# Patient Record
Sex: FEMALE | Race: WHITE | Employment: FULL TIME | URBAN - METROPOLITAN AREA
[De-identification: names, ages, dates, MRNs, and addresses within clinical notes are randomized per-mention and may not be internally consistent; named-entity substitution may affect disease eponyms.]

---

## 2017-02-18 ENCOUNTER — APPOINTMENT (OUTPATIENT)
Dept: GENERAL RADIOLOGY | Facility: HOSPITAL | Age: 42
End: 2017-02-18
Attending: EMERGENCY MEDICINE
Payer: COMMERCIAL

## 2017-02-18 ENCOUNTER — HOSPITAL ENCOUNTER (EMERGENCY)
Facility: HOSPITAL | Age: 42
Discharge: HOME | End: 2017-02-18
Attending: EMERGENCY MEDICINE
Payer: COMMERCIAL

## 2017-02-18 VITALS
HEART RATE: 78 BPM | TEMPERATURE: 99 F | DIASTOLIC BLOOD PRESSURE: 85 MMHG | OXYGEN SATURATION: 98 % | BODY MASS INDEX: 22 KG/M2 | SYSTOLIC BLOOD PRESSURE: 123 MMHG | WEIGHT: 120 LBS | RESPIRATION RATE: 16 BRPM

## 2017-02-18 DIAGNOSIS — S39.012A BACK STRAIN, INITIAL ENCOUNTER: ICD-10-CM

## 2017-02-18 DIAGNOSIS — S16.1XXA CERVICAL STRAIN, INITIAL ENCOUNTER: Primary | ICD-10-CM

## 2017-02-18 PROCEDURE — 72072 X-RAY EXAM THORAC SPINE 3VWS: CPT

## 2017-02-18 PROCEDURE — 72050 X-RAY EXAM NECK SPINE 4/5VWS: CPT

## 2017-02-18 PROCEDURE — 99284 EMERGENCY DEPT VISIT MOD MDM: CPT

## 2017-02-18 RX ORDER — IBUPROFEN 600 MG/1
600 TABLET ORAL ONCE
Status: COMPLETED | OUTPATIENT
Start: 2017-02-18 | End: 2017-02-18
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
## 2017-02-26 ENCOUNTER — HOSPITAL ENCOUNTER (EMERGENCY)
Facility: HOSPITAL | Age: 42
Discharge: HOME | End: 2017-02-26
Attending: EMERGENCY MEDICINE
Payer: COMMERCIAL

## 2017-02-26 VITALS
HEIGHT: 62 IN | DIASTOLIC BLOOD PRESSURE: 74 MMHG | OXYGEN SATURATION: 100 % | RESPIRATION RATE: 16 BRPM | BODY MASS INDEX: 22.08 KG/M2 | SYSTOLIC BLOOD PRESSURE: 124 MMHG | TEMPERATURE: 99 F | WEIGHT: 120 LBS | HEART RATE: 100 BPM

## 2017-02-26 DIAGNOSIS — R20.2 PARESTHESIAS: Primary | ICD-10-CM

## 2017-02-26 PROCEDURE — 99283 EMERGENCY DEPT VISIT LOW MDM: CPT

## 2017-02-26 RX ORDER — IBUPROFEN 600 MG/1
600 TABLET ORAL EVERY 8 HOURS PRN
Qty: 20 TABLET | Refills: 0 | Status: SHIPPED | OUTPATIENT
Start: 2017-02-26

## 2017-02-26 RX ORDER — CYCLOBENZAPRINE HCL 10 MG
10 TABLET ORAL 3 TIMES DAILY PRN
Qty: 21 TABLET | Refills: 0 | Status: SHIPPED | OUTPATIENT
Start: 2017-02-26 | End: 2017-03-05
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
## 2017-03-01 ENCOUNTER — OFFICE VISIT (OUTPATIENT)
Dept: NEUROLOGY | Facility: CLINIC | Age: 42
End: 2017-03-01

## 2017-03-01 VITALS
RESPIRATION RATE: 16 BRPM | SYSTOLIC BLOOD PRESSURE: 104 MMHG | HEART RATE: 84 BPM | DIASTOLIC BLOOD PRESSURE: 76 MMHG | BODY MASS INDEX: 22 KG/M2 | WEIGHT: 123 LBS

## 2017-03-01 DIAGNOSIS — R20.9 DISTURBANCE OF SKIN SENSATION: Primary | ICD-10-CM

## 2017-03-01 PROBLEM — R20.0 BILATERAL HAND NUMBNESS: Status: ACTIVE | Noted: 2017-03-01

## 2017-03-01 PROCEDURE — 99205 OFFICE O/P NEW HI 60 MIN: CPT | Performed by: OTHER

## 2017-03-01 NOTE — PROGRESS NOTES
Neurology H&P    Jeison Clock Patient Status:  No patient class for patient encounter    10/1/1975 MRN [id number]   Location [address] [place] [address], [address] [place] [address] Drive, [address] [facility] Attending No att. providers found   [facility] Day #  PCP Karley Philip MD     [facility] Problem List:  Patient Active Problem List:     Sinus tachycardia      PMHx:  No past medical history on file.     PSHx:      Past Surgical History             SocHx:    Smoking Status: Never Smoker                      Alcohol Use: Yes tremors; normal MEGHANN    REFLEXES: 2+ at biceps, 2+ triceps, 2+ at patella, 2+ at the ankles. Toes downgoing    GAIT: normal stance, normal toe gait and heel gait, tandem well.     Romberg's: negative  Labs:  nO RECENT LABS       Imaging:   C Spine 2/2017

## 2017-03-01 NOTE — PATIENT INSTRUCTIONS
Refill policies:    • Allow 2 business days for refills; controlled substances may take longer.   • Contact your pharmacy at least 5 days prior to running out of medication and have them send an electronic request or submit request through the “request re your physician has recommended that you have a procedure or additional testing performed. [facility]) will contact your insurance carrier to obtain pre-certification or prior authorization.     Unfortunately, KEVEN has seen an increas
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
## 2017-04-06 ENCOUNTER — OFFICE VISIT (OUTPATIENT)
Dept: NEUROLOGY | Facility: CLINIC | Age: 42
End: 2017-04-06

## 2017-04-06 DIAGNOSIS — R20.9 DISTURBANCE OF SKIN SENSATION: Primary | ICD-10-CM

## 2017-04-06 PROCEDURE — 95886 MUSC TEST DONE W/N TEST COMP: CPT | Performed by: OTHER

## 2017-04-06 PROCEDURE — 95909 NRV CNDJ TST 5-6 STUDIES: CPT | Performed by: OTHER

## 2017-04-06 NOTE — PROCEDURES
[facility]  [address] [facility]  Phone- [phone]  Nerve Conduction & Electromyography Report            Patient: Tashi Mukherjee Age: 37 Years 6 Months  Patient ID: [id number] Referring MPERLA: Kely King Spontaneous MUAP Recruitment    IA Fib PSW Fasc H.F. Amp Dur. PPP Pattern   R. DELTOID N None None None None N N N N   R. TRICEPS N None None None None N N N N   R. BICEPS N None None None None N N N N   R.  FLEX CARPI RAD N None None None None N N N N   R

## 2017-04-06 NOTE — PATIENT INSTRUCTIONS
Refill policies:    • Allow 2 business days for refills; controlled substances may take longer.   • Contact your pharmacy at least 5 days prior to running out of medication and have them send an electronic request or submit request through the “request re insurance carrier to obtain pre-certification or prior authorization. Unfortunately, KEVEN has seen an increase in denial of payment even though the procedure/test has been pre-certified.   You are strongly encouraged to contact your insurance carrier to v

## 2017-04-24 ENCOUNTER — OFFICE VISIT (OUTPATIENT)
Dept: NEUROLOGY | Facility: CLINIC | Age: 42
End: 2017-04-24

## 2017-04-24 VITALS
DIASTOLIC BLOOD PRESSURE: 62 MMHG | WEIGHT: 124 LBS | BODY MASS INDEX: 23 KG/M2 | SYSTOLIC BLOOD PRESSURE: 104 MMHG | RESPIRATION RATE: 18 BRPM | HEART RATE: 76 BPM

## 2017-04-24 DIAGNOSIS — M54.2 NECK PAIN: ICD-10-CM

## 2017-04-24 DIAGNOSIS — R20.9 DISTURBANCE OF SKIN SENSATION: Primary | ICD-10-CM

## 2017-04-24 PROCEDURE — 99213 OFFICE O/P EST LOW 20 MIN: CPT | Performed by: OTHER

## 2017-04-24 NOTE — PROGRESS NOTES
Neurology H&P    Blanca  Patient Status:  No patient class for patient encounter    10/1/1975 MRN [id number]   Location [address] [place] [address], [address] [place] [address] Drive, [address] [facility] Attending No att. providers found   [facility] Day #  PCP MD Bel Patricia reproduce this pain with head movements especially to the L. She denies any new weakness. Mostly just pain like \"something is pulling\". She has not gone to PT. She denies nay slurred speech or LUE symptoms. She denies nay falls or gait imbalance.  She den cyanosis      Neurologic:   MENTAL STATUS: alert, ox3, normal attention, language and fund of knowledge.       CRANIAL NERVES II to XII: PERRLA, no ptosis or diplopia, EOM intact, facial sensation intact, strong eye closure and lower facial muscles & jaw mu evaluation  - We can consider repeat EMG/NCS at a later date (3-4 months) if sympotoms persist but pt was unable to tolerate this test the last time and is hesitant to repeat it.      Veatrice Sessions, DO  Neurology

## 2017-06-12 ENCOUNTER — TELEPHONE (OUTPATIENT)
Dept: SURGERY | Facility: CLINIC | Age: 42
End: 2017-06-12

## 2017-06-12 NOTE — TELEPHONE ENCOUNTER
[employer]. Spoke to [place]. Pt can have claim billed. No auth given    Called pt major medical [employer] HMO. Spoke to rep [place].  States that pt needs a referral to have services rendered at outside facility or there is no coverage if not compl

## 2017-06-21 ENCOUNTER — TELEPHONE (OUTPATIENT)
Dept: NEUROLOGY | Facility: CLINIC | Age: 42
End: 2017-06-21

## 2017-06-27 ENCOUNTER — TELEPHONE (OUTPATIENT)
Dept: NEUROLOGY | Facility: CLINIC | Age: 42
End: 2017-06-27

## 2017-07-20 NOTE — TELEPHONE ENCOUNTER
LMOM for pt to contact office to reschedule 7/24/17 appt w/ Dr. Feng Cuevas. Notified pt her appt will be cancelled.

## 2017-08-24 ENCOUNTER — TELEPHONE (OUTPATIENT)
Dept: NEUROLOGY | Facility: CLINIC | Age: 42
End: 2017-08-24

## 2017-08-24 DIAGNOSIS — R20.9 DISTURBANCE OF SKIN SENSATION: ICD-10-CM

## 2017-08-24 DIAGNOSIS — M54.2 NECK PAIN: Primary | ICD-10-CM

## 2017-09-05 ENCOUNTER — APPOINTMENT (OUTPATIENT)
Dept: PHYSICAL THERAPY | Facility: HOSPITAL | Age: 42
End: 2017-09-05
Attending: Other
Payer: COMMERCIAL

## 2017-09-07 ENCOUNTER — APPOINTMENT (OUTPATIENT)
Dept: PHYSICAL THERAPY | Facility: HOSPITAL | Age: 42
End: 2017-09-07
Attending: Other
Payer: COMMERCIAL

## 2017-09-12 ENCOUNTER — APPOINTMENT (OUTPATIENT)
Dept: PHYSICAL THERAPY | Facility: HOSPITAL | Age: 42
End: 2017-09-12
Attending: Other
Payer: COMMERCIAL

## 2017-09-12 ENCOUNTER — HOSPITAL ENCOUNTER (OUTPATIENT)
Dept: PHYSICAL THERAPY | Facility: HOSPITAL | Age: 42
Setting detail: THERAPIES SERIES
Discharge: HOME | End: 2017-09-12
Attending: Other
Payer: COMMERCIAL

## 2017-09-12 DIAGNOSIS — M54.2 NECK PAIN: ICD-10-CM

## 2017-09-12 DIAGNOSIS — R20.9 DISTURBANCE OF SKIN SENSATION: ICD-10-CM

## 2017-09-12 PROCEDURE — 97162 PT EVAL MOD COMPLEX 30 MIN: CPT

## 2017-09-12 PROCEDURE — 97140 MANUAL THERAPY 1/> REGIONS: CPT
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
## 2017-09-14 ENCOUNTER — APPOINTMENT (OUTPATIENT)
Dept: PHYSICAL THERAPY | Facility: HOSPITAL | Age: 42
End: 2017-09-14
Attending: Other
Payer: COMMERCIAL

## 2017-09-19 ENCOUNTER — APPOINTMENT (OUTPATIENT)
Dept: PHYSICAL THERAPY | Facility: HOSPITAL | Age: 42
End: 2017-09-19
Attending: Other
Payer: COMMERCIAL

## 2017-09-21 ENCOUNTER — APPOINTMENT (OUTPATIENT)
Dept: PHYSICAL THERAPY | Facility: HOSPITAL | Age: 42
End: 2017-09-21
Attending: Other
Payer: COMMERCIAL

## 2017-09-26 ENCOUNTER — APPOINTMENT (OUTPATIENT)
Dept: PHYSICAL THERAPY | Facility: HOSPITAL | Age: 42
End: 2017-09-26
Attending: Other
Payer: COMMERCIAL

## 2017-09-28 ENCOUNTER — APPOINTMENT (OUTPATIENT)
Dept: PHYSICAL THERAPY | Facility: HOSPITAL | Age: 42
End: 2017-09-28
Attending: Other
Payer: COMMERCIAL

## 2017-10-10 ENCOUNTER — HOSPITAL ENCOUNTER (OUTPATIENT)
Dept: PHYSICAL THERAPY | Facility: HOSPITAL | Age: 42
Setting detail: THERAPIES SERIES
Discharge: HOME | End: 2017-10-10
Attending: Other
Payer: COMMERCIAL

## 2017-10-10 PROCEDURE — 97110 THERAPEUTIC EXERCISES: CPT

## 2017-10-10 PROCEDURE — 97140 MANUAL THERAPY 1/> REGIONS: CPT
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
## 2017-10-17 ENCOUNTER — HOSPITAL ENCOUNTER (OUTPATIENT)
Dept: PHYSICAL THERAPY | Facility: HOSPITAL | Age: 42
Setting detail: THERAPIES SERIES
Discharge: HOME | End: 2017-10-17
Attending: Other
Payer: COMMERCIAL

## 2017-10-17 PROCEDURE — 97110 THERAPEUTIC EXERCISES: CPT

## 2017-10-17 PROCEDURE — 97140 MANUAL THERAPY 1/> REGIONS: CPT
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
## 2017-10-24 ENCOUNTER — HOSPITAL ENCOUNTER (OUTPATIENT)
Dept: PHYSICAL THERAPY | Facility: HOSPITAL | Age: 42
Setting detail: THERAPIES SERIES
Discharge: HOME | End: 2017-10-24
Attending: Other
Payer: COMMERCIAL

## 2017-10-24 PROCEDURE — 97110 THERAPEUTIC EXERCISES: CPT

## 2017-10-24 PROCEDURE — 97140 MANUAL THERAPY 1/> REGIONS: CPT
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
## 2017-11-07 ENCOUNTER — HOSPITAL ENCOUNTER (OUTPATIENT)
Dept: PHYSICAL THERAPY | Facility: HOSPITAL | Age: 42
Setting detail: THERAPIES SERIES
End: 2017-11-07
Attending: Other
Payer: COMMERCIAL

## 2017-11-07 PROCEDURE — 97110 THERAPEUTIC EXERCISES: CPT

## 2017-11-07 PROCEDURE — 97140 MANUAL THERAPY 1/> REGIONS: CPT
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
## 2017-11-14 ENCOUNTER — HOSPITAL ENCOUNTER (OUTPATIENT)
Dept: PHYSICAL THERAPY | Facility: HOSPITAL | Age: 42
Setting detail: THERAPIES SERIES
Discharge: HOME | End: 2017-11-14
Attending: Other
Payer: COMMERCIAL

## 2017-11-14 PROCEDURE — 97140 MANUAL THERAPY 1/> REGIONS: CPT

## 2017-11-14 PROCEDURE — 97110 THERAPEUTIC EXERCISES: CPT
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
## 2017-11-21 ENCOUNTER — APPOINTMENT (OUTPATIENT)
Dept: PHYSICAL THERAPY | Facility: HOSPITAL | Age: 42
End: 2017-11-21
Attending: Other
Payer: COMMERCIAL

## 2018-03-16 ENCOUNTER — TELEPHONE (OUTPATIENT)
Dept: NEUROLOGY | Facility: CLINIC | Age: 43
End: 2018-03-16
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
## 2018-03-20 ENCOUNTER — OFFICE VISIT (OUTPATIENT)
Dept: NEUROLOGY | Facility: CLINIC | Age: 43
End: 2018-03-20

## 2018-03-20 ENCOUNTER — TELEPHONE (OUTPATIENT)
Dept: NEUROLOGY | Facility: CLINIC | Age: 43
End: 2018-03-20

## 2018-03-20 ENCOUNTER — LAB ENCOUNTER (OUTPATIENT)
Dept: LAB | Age: 43
End: 2018-03-20
Attending: Other
Payer: COMMERCIAL

## 2018-03-20 VITALS
HEART RATE: 80 BPM | DIASTOLIC BLOOD PRESSURE: 80 MMHG | WEIGHT: 120 LBS | SYSTOLIC BLOOD PRESSURE: 110 MMHG | BODY MASS INDEX: 22 KG/M2

## 2018-03-20 DIAGNOSIS — R89.9 ABNORMAL LABORATORY TEST: Primary | ICD-10-CM

## 2018-03-20 DIAGNOSIS — M54.2 NECK PAIN: ICD-10-CM

## 2018-03-20 DIAGNOSIS — R20.9 DISTURBANCE OF SKIN SENSATION: Primary | ICD-10-CM

## 2018-03-20 DIAGNOSIS — R20.9 DISTURBANCE OF SKIN SENSATION: ICD-10-CM

## 2018-03-20 LAB
BUN BLD-MCNC: 15 MG/DL (ref 8–20)
CALCIUM BLD-MCNC: 9.5 MG/DL (ref 8.3–10.3)
CHLORIDE: 105 MMOL/L (ref 101–111)
CO2: 29 MMOL/L (ref 22–32)
CREAT BLD-MCNC: 0.84 MG/DL (ref 0.55–1.02)
GLUCOSE BLD-MCNC: 97 MG/DL (ref 70–99)
HAV AB SERPL IA-ACNC: 749 PG/ML (ref 193–986)
POTASSIUM SERPL-SCNC: 4.2 MMOL/L (ref 3.6–5.1)
RHEUMATOID FACT SERPL-ACNC: <10 IU/ML (ref ?–15)
SED RATE-ML: 5 MM/HR (ref 0–25)
SODIUM SERPL-SCNC: 140 MMOL/L (ref 136–144)

## 2018-03-20 PROCEDURE — 86431 RHEUMATOID FACTOR QUANT: CPT | Performed by: OTHER

## 2018-03-20 PROCEDURE — 85652 RBC SED RATE AUTOMATED: CPT | Performed by: OTHER

## 2018-03-20 PROCEDURE — 86235 NUCLEAR ANTIGEN ANTIBODY: CPT | Performed by: OTHER

## 2018-03-20 PROCEDURE — 80048 BASIC METABOLIC PNL TOTAL CA: CPT | Performed by: OTHER

## 2018-03-20 PROCEDURE — 84207 ASSAY OF VITAMIN B-6: CPT | Performed by: OTHER

## 2018-03-20 PROCEDURE — 86225 DNA ANTIBODY NATIVE: CPT | Performed by: OTHER

## 2018-03-20 PROCEDURE — 36415 COLL VENOUS BLD VENIPUNCTURE: CPT | Performed by: OTHER

## 2018-03-20 PROCEDURE — 86038 ANTINUCLEAR ANTIBODIES: CPT | Performed by: OTHER

## 2018-03-20 PROCEDURE — 99213 OFFICE O/P EST LOW 20 MIN: CPT | Performed by: OTHER

## 2018-03-20 PROCEDURE — 82607 VITAMIN B-12: CPT | Performed by: OTHER

## 2018-03-20 NOTE — PROGRESS NOTES
Neurology H&P    Sindi Arrow Patient Status:  No patient class for patient encounter    10/1/1975 MRN [id number]   Location ED [facility], [address], [address] Attending No att. providers found   Hosp Day #  PCP DO [place] her BUE. She feels that PT had helped a lot with this. She has tried to adjust her work space for better ergonomics. She states that her paresthesias are very random and that she cannot identify any exacerbating factors.  She states that she is also having dry  Extremities: No clubbing or cyanosis      Neurologic:   MENTAL STATUS: alert, ox3, normal attention, language and fund of knowledge.       CRANIAL NERVES II to XII: PERRLA, no ptosis or diplopia, EOM intact, facial sensation intact, strong eye closure C spine for evaluation of any cervical stenosis that might be contributing to her paresthesias and subjective weakness.  Etiology of her whole body symptoms is very unclear and unlikely to be neuropathy givrn acute onset and previous CT C spine without sten

## 2018-03-20 NOTE — PATIENT INSTRUCTIONS
Refill policies:    • Allow 2-3 business days for refills; controlled substances may take longer.   • Contact your pharmacy at least 5 days prior to running out of medication and have them send an electronic request or submit request through the [facility] for the entire amount billed. Precertification and Prior Authorizations  If your physician has recommended that you have a procedure or additional testing performed.   [employer]) will contact your insurance carrier to obtain pr

## 2018-03-20 NOTE — PROGRESS NOTES
Patient here to follow up for Neck pain. Completed PT and consulted with chiropractor but no relief.   States she has weird sensations- numbness and tingling in arms, hands and legs and feet

## 2018-03-20 NOTE — TELEPHONE ENCOUNTER
I called MsWilliams Clay Mansfield on the phone today regarding her blood work. No abnormalities noted to date.  She verbalized understanding and had no further questions

## 2018-03-21 LAB
ANA SCREEN: POSITIVE
CENTROMERE AUTOAB: <100 AU/ML (ref ?–100)
DSDNA AUTOAB: <100 IU/ML (ref ?–100)
HISTONE AUTOAB: <100 AU/ML (ref ?–100)
JO-1 AUTOAB: <100 AU/ML (ref ?–100)
RNP AUTOAB: <100 AU/ML (ref ?–100)
SCL-70 AUTOAB: <100 AU/ML (ref ?–100)
SM AUTOAB (SMITH): <100 AU/ML (ref ?–100)
SSA AUTOAB: 140 AU/ML (ref ?–100)
SSA AUTOAB: 140 AU/ML (ref ?–100)
SSB AUTOAB: <100 AU/ML (ref ?–100)
SSB AUTOAB: <100 AU/ML (ref ?–100)

## 2018-03-22 ENCOUNTER — TELEPHONE (OUTPATIENT)
Dept: NEUROLOGY | Facility: CLINIC | Age: 43
End: 2018-03-22

## 2018-03-22 NOTE — TELEPHONE ENCOUNTER
Spoke with patient. She stated she is very hesitant to see a Rheumatologist since she is not financially prepared to spent thousands of dollars if it is not anything severe.  Patient stated she has been having current symptoms for the past year and is well

## 2018-03-22 NOTE — TELEPHONE ENCOUNTER
I called Ms. Miki Zack on the phone regarding her recent lab work. She had a + LUISA and elevated Ssa. I will refer her to rheumatology for further evaluations as EMG has not demonstrated any neuropathies.

## 2018-03-23 LAB — VITAMIN B6: 152.2 NMOL/L

## 2018-03-26 ENCOUNTER — PATIENT MESSAGE (OUTPATIENT)
Dept: NEUROLOGY | Facility: CLINIC | Age: 43
End: 2018-03-26

## 2018-03-26 NOTE — TELEPHONE ENCOUNTER
From: Simone Garibay  To: Martita Pineda DO  Sent: 3/26/2018 9:59 AM CDT  Subject: Test Results Question    Vit B6 was elevated. I did not fast for this test. Not sure if it should be redone or what this means exactly.

## 2018-04-10 ENCOUNTER — TELEPHONE (OUTPATIENT)
Dept: NEUROLOGY | Facility: CLINIC | Age: 43
End: 2018-04-10

## 2018-04-10 DIAGNOSIS — M54.2 NECK PAIN: Primary | ICD-10-CM

## 2018-04-10 NOTE — TELEPHONE ENCOUNTER
Attempted to reach patient. Did not answer and could not leave message, inbox full. Per Epic review, patient has already scheduled the original MRI with and without contrast. She would need to cancel.

## 2018-04-10 NOTE — TELEPHONE ENCOUNTER
Pt calling office back, stating that she received a missed call on her office phone.   Informed her that I do not see any record of anyone from our office reaching out to her today, although it does appear that her rheumatology referral status was updated t

## 2018-04-10 NOTE — TELEPHONE ENCOUNTER
Pt calling to report that she has her MRI c-spine scheduled this weekend, but she did not realize that it was ordered W+WO.   She states she had a localized reaction (itching/hives) and an upset stomach in the past with IV contrast.  Asking if she can have

## 2018-04-11 ENCOUNTER — TELEPHONE (OUTPATIENT)
Dept: NEUROLOGY | Facility: CLINIC | Age: 43
End: 2018-04-11

## 2018-04-12 ENCOUNTER — PATIENT MESSAGE (OUTPATIENT)
Dept: NEUROLOGY | Facility: CLINIC | Age: 43
End: 2018-04-12

## 2018-04-13 NOTE — TELEPHONE ENCOUNTER
From: Isamar Garg  To: Rawland Gitelman, DO  Sent: 4/12/2018 10:35 AM CDT  Subject: Other    Hi. I have an order for an MRI that I am now going to schedule at a [address] [facility] facility to accommodate my insurance plan.  Can you please fax that orde

## (undated) NOTE — ED AVS SNAPSHOT
[facility] Emergency Department    [facility] [address] [place] [address]    Phone:  [phone]    Fax:  [phone]           Ms. Juanito Okeefe   MRN: [id number]    Department:  [facility] Emergency Department   Date of Visit:  2/26 To Check ER Wait Times:  TEXT 'ERwait' to 78150      Click [email or web address]      Or call [phone]    If you have any problems with your follow-up, please call our  at [phone]    Si usted tiene algun problema con cesar sequimiento, por I have read and understand the instructions given to me by my caregivers. 24-Hour Pharmacies        Pharmacy Address Phone Number   [address] [facility] ([address]) [address] [place] [address].  [facility] &

## (undated) NOTE — ED AVS SNAPSHOT
[facility] Emergency Department    [place] [address] [place] [address]    Phone:  [phone]    Fax:  [phone]           Ms. Heather Leslie   MRN: [id number]    Department:  [facility] Emergency Department   Date of Visit:  2/18 IF THERE IS ANY CHANGE OR WORSENING OF YOUR CONDITION, CALL YOUR PRIMARY CARE PHYSICIAN AT ONCE OR RETURN IMMEDIATELY TO THE EMERGENCY DEPARTMENT.     If you have been prescribed any medication(s), please fill your prescription right away and begin taking t

## (undated) NOTE — MR AVS SNAPSHOT
[facility], [place] [id number] [facility], [address] [place] [address]. [address] [address] [phone]               Thank you for choosing us for your health care visit with Ibrahima Lock DO.   We are glad to serve you and happy to provide you with [facility]  protocol for controlled substances:  Written prescriptions      ? EFFECTIVE April 1, 2017 PATIENTS MUST  THEIR OWN NARCOTIC PRESCRIPTIONS. ? Written prescriptions must be picked up in office. ?  Please allow the office 48-72 hours to fill t Take 1 tablet (600 mg total) by mouth every 8 (eight) hours as needed.    Commonly known as:  [address] [facility] online at  [email or web address]

## (undated) NOTE — ED AVS SNAPSHOT
[facility] Emergency Department    [place] [address] [place] [address]    Phone:  [phone]    Fax:  [phone]           Ms. Chele Bray   MRN: [id number]    Department:  [facility] Emergency Department   Date of Visit:  2/26 IF THERE IS ANY CHANGE OR WORSENING OF YOUR CONDITION, CALL YOUR PRIMARY CARE PHYSICIAN AT ONCE OR RETURN IMMEDIATELY TO THE EMERGENCY DEPARTMENT.     If you have been prescribed any medication(s), please fill your prescription right away and begin taking t

## (undated) NOTE — MR AVS SNAPSHOT
[address], [address] [address] [phone]               Thank you for choosing us for your health care visit with Prema Pina DO.   We are glad to serve you and happy to provide you with [facility] Phone:  [phone]   Fax:  [phone]    Diagnoses:  Neck pain   Disturbance of skin sensation   Order:  Op Referral To THE [facility] OF [facility]    EDW [address] [place] [address]       Comment:  Cervical pain followi Disturbance of skin sensation    Neck pain      Instructions and Information about Your Health      Refill policies:    ? Allow 2 business days for refills; controlled substances may take longer. ?  Contact your pharmacy at least 5 days prior to running o If your physician has recommended that you have a procedure or additional testing performed. [facility]) will contact your insurance carrier to obtain pre-certification or prior authorization.     Unfortunately, KEVEN has seen an incr

## (undated) NOTE — MR AVS SNAPSHOT
[facility]  [address] [facility], [address] [place] [address]  [place] [address] [phone]               Thank you for choosing us for your health care visit with Nish Méndez DO.   We are glad to serve you and happy to provide you with [facility]

## (undated) NOTE — ED AVS SNAPSHOT
[facility] Emergency Department    [facility] [address] [place] [address]    Phone:  [phone]    Fax:  [phone]           Ms. Candis Alcazar   MRN: [id number]    Department:  [facility] Emergency Department   Date of Visit:  2/18 To Check ER Wait Times:  TEXT 'ERwait' to 60016      Click [email or web address]      Or call [phone]    If you have any problems with your follow-up, please call our  at [phone]    Si usted tiene algun problema con cesar sequimiento, por f I have read and understand the instructions given to me by my caregivers. 24-Hour Pharmacies        Pharmacy Address Phone Number   [facility] [address] [facility] ([address]) [address]  [facility] & Reversal cervical lordosis can be seen with muscle spasm. No acute bony injury or significant degenerative change. Dictated by: Do Sin MD on 2/18/2017 at 19:32       Approved by:  Do Sin MD              Narrative:    PROCEDURE:  XR CERVI